# Patient Record
Sex: MALE | Race: ASIAN | Employment: FULL TIME | URBAN - METROPOLITAN AREA
[De-identification: names, ages, dates, MRNs, and addresses within clinical notes are randomized per-mention and may not be internally consistent; named-entity substitution may affect disease eponyms.]

---

## 2018-12-24 ENCOUNTER — HOSPITAL ENCOUNTER (OUTPATIENT)
Dept: MRI IMAGING | Facility: HOSPITAL | Age: 38
Discharge: HOME | End: 2018-12-24
Attending: ORTHOPAEDIC SURGERY
Payer: COMMERCIAL

## 2018-12-24 DIAGNOSIS — M53.82 NECK MUSCLE WEAKNESS: ICD-10-CM

## 2018-12-24 PROCEDURE — 72141 MRI NECK SPINE W/O DYE: CPT | Performed by: ORTHOPAEDIC SURGERY

## 2019-02-11 ENCOUNTER — TELEPHONE (OUTPATIENT)
Dept: SURGERY | Facility: CLINIC | Age: 39
End: 2019-02-11

## 2019-02-22 ENCOUNTER — OFFICE VISIT (OUTPATIENT)
Dept: PAIN CLINIC | Facility: CLINIC | Age: 39
End: 2019-02-22
Payer: COMMERCIAL

## 2019-02-22 VITALS
BODY MASS INDEX: 28.56 KG/M2 | HEIGHT: 71 IN | DIASTOLIC BLOOD PRESSURE: 80 MMHG | SYSTOLIC BLOOD PRESSURE: 122 MMHG | HEART RATE: 70 BPM | WEIGHT: 204 LBS

## 2019-02-22 DIAGNOSIS — M25.511 CHRONIC RIGHT SHOULDER PAIN: Primary | ICD-10-CM

## 2019-02-22 DIAGNOSIS — G89.29 CHRONIC RIGHT SHOULDER PAIN: Primary | ICD-10-CM

## 2019-02-22 PROCEDURE — 99203 OFFICE O/P NEW LOW 30 MIN: CPT | Performed by: ANESTHESIOLOGY

## 2019-02-22 RX ORDER — OLMESARTAN MEDOXOMIL 20 MG/1
20 TABLET ORAL
COMMUNITY
Start: 2015-02-12

## 2019-02-22 RX ORDER — ACETAMINOPHEN 500 MG
500 TABLET ORAL EVERY 6 HOURS PRN
COMMUNITY

## 2019-02-22 NOTE — H&P
Name: Tony Mcguire   : 1980   DOS: 2019     Chief complaint: Right-sided shoulder pain    History of present illness:  Tony Mcguire is a 44year old right-handed male with her by Dr. Augusta Villagomez for evaluation of [age] year old male not in acute distress  /80 (BP Location: Right arm, Patient Position: Sitting, Cuff Size: adult)   Pulse 70   Ht 71\"   Wt 204 lb   BMI 28.45 kg/m²    The patient is awake, alert, oriented and corporative. He has a normal affect.  The pa otherwise suspicion for this is low. I sent him a prescription for diclofenac transdermal gel. We can certainly try cervical epidural steroid injection for him if his shoulder MRI is negative.       Luis Stanton MD  Pain Management

## 2019-02-22 NOTE — PROGRESS NOTES
PHYSICAL EXAM:   Physical Exam   Constitutional:             Location of Pain: right shoulder pain    Date Pain Began:  Oct 2013          Work Related:   No        Receiving Work Comp/Disability:   No    Numeric Rating Scale:  Pain at Present:  3-4

## 2019-03-01 ENCOUNTER — HOSPITAL ENCOUNTER (OUTPATIENT)
Dept: MRI IMAGING | Facility: HOSPITAL | Age: 39
Discharge: HOME | End: 2019-03-01
Attending: ANESTHESIOLOGY
Payer: COMMERCIAL

## 2019-03-01 DIAGNOSIS — M25.511 CHRONIC RIGHT SHOULDER PAIN: ICD-10-CM

## 2019-03-01 DIAGNOSIS — G89.29 CHRONIC RIGHT SHOULDER PAIN: ICD-10-CM

## 2019-03-01 PROCEDURE — 73221 MRI JOINT UPR EXTREM W/O DYE: CPT | Performed by: ANESTHESIOLOGY

## 2019-03-05 ENCOUNTER — TELEPHONE (OUTPATIENT)
Dept: PAIN CLINIC | Facility: CLINIC | Age: 39
End: 2019-03-05

## 2019-03-05 NOTE — TELEPHONE ENCOUNTER
Called patient and relayed the below message. All questions answered and no further needs at this time. MRI shoulder noted with subacromial impingement and Dr. Leo Oakley recommend the patient see an orthopedic surgeon for further evaluation.